# Patient Record
Sex: MALE | Race: WHITE | URBAN - METROPOLITAN AREA
[De-identification: names, ages, dates, MRNs, and addresses within clinical notes are randomized per-mention and may not be internally consistent; named-entity substitution may affect disease eponyms.]

---

## 2022-07-19 ENCOUNTER — OFFICE VISIT (OUTPATIENT)
Dept: URBAN - METROPOLITAN AREA CLINIC 44 | Facility: CLINIC | Age: 52
End: 2022-07-19
Payer: COMMERCIAL

## 2022-07-19 DIAGNOSIS — E11.9 TYPE 2 DIABETES MELLITUS W/O COMPLICATION: Primary | ICD-10-CM

## 2022-07-19 DIAGNOSIS — H52.13 MYOPIA, BILATERAL: ICD-10-CM

## 2022-07-19 DIAGNOSIS — H04.123 TEAR FILM INSUFFICIENCY OF BILATERAL LACRIMAL GLANDS: ICD-10-CM

## 2022-07-19 DIAGNOSIS — Z79.84 LONG TERM (CURRENT) USE OF ORAL HYPOGLYCEMIC DRUGS: ICD-10-CM

## 2022-07-19 PROCEDURE — 92004 COMPRE OPH EXAM NEW PT 1/>: CPT | Performed by: OPTOMETRIST

## 2022-07-19 ASSESSMENT — INTRAOCULAR PRESSURE
OS: 20
OD: 20

## 2022-07-19 ASSESSMENT — KERATOMETRY
OD: 45.63
OS: 45.38

## 2022-07-19 ASSESSMENT — VISUAL ACUITY
OD: 20/20
OS: 20/20

## 2022-07-19 NOTE — IMPRESSION/PLAN
Impression: Type 2 diabetes mellitus w/o complication: A30.7. No vascular abnormalities noted per exam and OCT. Plan: PLAN: Discussed findings. Stressed importance of regular follow ups with PCP. Discussed with patient to maintain A1C at 7.0 or below will greatly decreased chances/progression of retinopathy. RTC 12 months for complete and OCT mac.  OPTOS (If DM for >10yrs)

## 2023-07-07 ENCOUNTER — OFFICE VISIT (OUTPATIENT)
Dept: URBAN - METROPOLITAN AREA CLINIC 44 | Facility: CLINIC | Age: 53
End: 2023-07-07
Payer: COMMERCIAL

## 2023-07-07 DIAGNOSIS — H04.123 TEAR FILM INSUFFICIENCY OF BILATERAL LACRIMAL GLANDS: ICD-10-CM

## 2023-07-07 DIAGNOSIS — Z79.84 LONG TERM (CURRENT) USE OF ORAL HYPOGLYCEMIC DRUGS: ICD-10-CM

## 2023-07-07 DIAGNOSIS — E11.9 TYPE 2 DIABETES MELLITUS W/O COMPLICATION: Primary | ICD-10-CM

## 2023-07-07 PROCEDURE — 92014 COMPRE OPH EXAM EST PT 1/>: CPT | Performed by: OPTOMETRIST

## 2023-07-07 ASSESSMENT — INTRAOCULAR PRESSURE
OS: 19
OD: 20

## 2023-07-07 ASSESSMENT — VISUAL ACUITY
OS: 20/20
OD: 20/20

## 2023-07-07 ASSESSMENT — KERATOMETRY
OS: 45.38
OD: 45.50

## 2023-07-07 NOTE — IMPRESSION/PLAN
Impression: Tear film insufficiency of bilateral lacrimal glands: B83.486.
-Clinical evaluation shows mild DED signs. Plan: PLAN: Recommend Lipid based tears to be used 4X daily. Observe condition and RTC if symptom's worsen.

## 2023-07-07 NOTE — IMPRESSION/PLAN
Impression: Type 2 diabetes mellitus w/o complication: A51.8.
-No vascular abnormalities noted per exam and OCT. -No DME OU Plan: PLAN: Discussed findings. Stressed importance of glycemic control (Target A1C <7.0) and continued care with PCP. RTC 12 months for complete exam + OCT (Mac) + OPTOS (DM Hx of more than 10 yrs), + report to PCP.